# Patient Record
Sex: FEMALE | Race: WHITE | URBAN - METROPOLITAN AREA
[De-identification: names, ages, dates, MRNs, and addresses within clinical notes are randomized per-mention and may not be internally consistent; named-entity substitution may affect disease eponyms.]

---

## 2024-01-29 ENCOUNTER — OFFICE VISIT (OUTPATIENT)
Age: 29
End: 2024-01-29

## 2024-01-29 VITALS
DIASTOLIC BLOOD PRESSURE: 87 MMHG | HEART RATE: 117 BPM | SYSTOLIC BLOOD PRESSURE: 138 MMHG | TEMPERATURE: 99 F | OXYGEN SATURATION: 95 % | WEIGHT: 152 LBS

## 2024-01-29 DIAGNOSIS — J10.1 INFLUENZA B: ICD-10-CM

## 2024-01-29 DIAGNOSIS — R50.9 FEVER, UNSPECIFIED FEVER CAUSE: Primary | ICD-10-CM

## 2024-01-29 LAB
INFLUENZA A ANTIBODY: NEGATIVE
INFLUENZA B ANTIBODY: POSITIVE
Lab: NORMAL
QC PASS/FAIL: NORMAL
SARS-COV-2 RDRP RESP QL NAA+PROBE: NEGATIVE

## 2024-01-29 RX ORDER — OSELTAMIVIR PHOSPHATE 75 MG/1
75 CAPSULE ORAL 2 TIMES DAILY
Qty: 10 CAPSULE | Refills: 0 | Status: SHIPPED | OUTPATIENT
Start: 2024-01-29 | End: 2024-02-03

## 2024-01-29 ASSESSMENT — ENCOUNTER SYMPTOMS
SINUS PRESSURE: 0
HEMOPTYSIS: 0
SORE THROAT: 1
DIARRHEA: 0
RHINORRHEA: 0
COUGH: 1
ABDOMINAL PAIN: 0
EYE REDNESS: 0
HEARTBURN: 0
SHORTNESS OF BREATH: 0
CHEST TIGHTNESS: 0
WHEEZING: 0
VOMITING: 0

## 2024-01-29 NOTE — PROGRESS NOTES
Evelyne Michele (:  1995) is a 28 y.o. female,New patient, here for evaluation of the following chief complaint(s):  Cough (Cough, headache, sore throat, fevers, chills, body aches x 2 days/Son has croup, concerned about mastitis)      ASSESSMENT/PLAN:  1. Fever, unspecified fever cause  -increase fluid intake,tylenol as needed  - POCT COVID-19 Rapid, NAAT  - POCT Influenza A/B    2. Influenza B  -rest  - oseltamivir (TAMIFLU) 75 MG capsule; Take 1 capsule by mouth 2 times daily for 5 days  Dispense: 10 capsule; Refill: 0       No follow-ups on file.    SUBJECTIVE/OBJECTIVE:    History provided by:  Patient  Cough  The cough is Non-productive. Associated symptoms include chills, a fever, headaches, myalgias, nasal congestion and a sore throat. Pertinent negatives include no chest pain, ear pain, eye redness, heartburn, hemoptysis, rash, rhinorrhea, shortness of breath or wheezing.       Vitals:    24 1753   BP: 138/87   Site: Right Upper Arm   Position: Sitting   Cuff Size: Medium Adult   Pulse: (!) 117   Temp: 99 °F (37.2 °C)   TempSrc: Oral   SpO2: 95%   Weight: 68.9 kg (152 lb)       Review of Systems   Constitutional:  Positive for chills and fever. Negative for activity change and appetite change.   HENT:  Positive for congestion and sore throat. Negative for ear pain, rhinorrhea and sinus pressure.    Eyes:  Negative for redness.   Respiratory:  Positive for cough. Negative for hemoptysis, chest tightness, shortness of breath and wheezing.    Cardiovascular:  Negative for chest pain.   Gastrointestinal:  Negative for abdominal pain, diarrhea, heartburn and vomiting.   Musculoskeletal:  Positive for myalgias.   Skin:  Negative for rash.   Neurological:  Positive for headaches.       Physical Exam  Constitutional:       General: She is not in acute distress.  HENT:      Nose: Congestion present.      Mouth/Throat:      Mouth: Mucous membranes are moist.      Pharynx: No oropharyngeal exudate or